# Patient Record
Sex: FEMALE | Race: WHITE
[De-identification: names, ages, dates, MRNs, and addresses within clinical notes are randomized per-mention and may not be internally consistent; named-entity substitution may affect disease eponyms.]

---

## 2020-08-17 ENCOUNTER — HOSPITAL ENCOUNTER (INPATIENT)
Dept: HOSPITAL 35 - ER | Age: 55
LOS: 1 days | Discharge: HOME | DRG: 192 | End: 2020-08-18
Attending: INTERNAL MEDICINE | Admitting: INTERNAL MEDICINE
Payer: COMMERCIAL

## 2020-08-17 VITALS — SYSTOLIC BLOOD PRESSURE: 142 MMHG | DIASTOLIC BLOOD PRESSURE: 62 MMHG

## 2020-08-17 VITALS — DIASTOLIC BLOOD PRESSURE: 56 MMHG | SYSTOLIC BLOOD PRESSURE: 128 MMHG

## 2020-08-17 VITALS — HEIGHT: 59.02 IN | WEIGHT: 135 LBS | BODY MASS INDEX: 27.21 KG/M2

## 2020-08-17 VITALS — DIASTOLIC BLOOD PRESSURE: 49 MMHG | SYSTOLIC BLOOD PRESSURE: 127 MMHG

## 2020-08-17 VITALS — DIASTOLIC BLOOD PRESSURE: 60 MMHG | SYSTOLIC BLOOD PRESSURE: 138 MMHG

## 2020-08-17 VITALS — SYSTOLIC BLOOD PRESSURE: 145 MMHG | DIASTOLIC BLOOD PRESSURE: 60 MMHG

## 2020-08-17 VITALS — DIASTOLIC BLOOD PRESSURE: 57 MMHG | SYSTOLIC BLOOD PRESSURE: 134 MMHG

## 2020-08-17 DIAGNOSIS — Z82.49: ICD-10-CM

## 2020-08-17 DIAGNOSIS — Z80.3: ICD-10-CM

## 2020-08-17 DIAGNOSIS — Z79.899: ICD-10-CM

## 2020-08-17 DIAGNOSIS — I10: ICD-10-CM

## 2020-08-17 DIAGNOSIS — Z71.6: ICD-10-CM

## 2020-08-17 DIAGNOSIS — E11.42: ICD-10-CM

## 2020-08-17 DIAGNOSIS — Z60.2: ICD-10-CM

## 2020-08-17 DIAGNOSIS — Z88.8: ICD-10-CM

## 2020-08-17 DIAGNOSIS — Z88.6: ICD-10-CM

## 2020-08-17 DIAGNOSIS — J44.1: Primary | ICD-10-CM

## 2020-08-17 DIAGNOSIS — Z88.0: ICD-10-CM

## 2020-08-17 DIAGNOSIS — Z20.828: ICD-10-CM

## 2020-08-17 DIAGNOSIS — F17.210: ICD-10-CM

## 2020-08-17 DIAGNOSIS — F41.9: ICD-10-CM

## 2020-08-17 DIAGNOSIS — F32.9: ICD-10-CM

## 2020-08-17 DIAGNOSIS — Z86.2: ICD-10-CM

## 2020-08-17 LAB
ALBUMIN SERPL-MCNC: 3.9 G/DL (ref 3.4–5)
ALT SERPL-CCNC: 43 U/L (ref 30–65)
ANION GAP SERPL CALC-SCNC: 17 MMOL/L (ref 7–16)
AST SERPL-CCNC: 37 U/L (ref 15–37)
BASOPHILS NFR BLD AUTO: 0.4 % (ref 0–2)
BILIRUB SERPL-MCNC: 0.4 MG/DL (ref 0.2–1)
BUN SERPL-MCNC: 12 MG/DL (ref 7–18)
CALCIUM SERPL-MCNC: 9.1 MG/DL (ref 8.5–10.1)
CHLORIDE SERPL-SCNC: 100 MMOL/L (ref 98–107)
CO2 SERPL-SCNC: 21 MMOL/L (ref 21–32)
CREAT SERPL-MCNC: 0.9 MG/DL (ref 0.6–1)
EOSINOPHIL NFR BLD: 4.4 % (ref 0–3)
ERYTHROCYTE [DISTWIDTH] IN BLOOD BY AUTOMATED COUNT: 14 % (ref 10.5–14.5)
GLUCOSE SERPL-MCNC: 257 MG/DL (ref 74–106)
GRANULOCYTES NFR BLD MANUAL: 54.5 % (ref 36–66)
HCT VFR BLD CALC: 44.1 % (ref 37–47)
HGB BLD-MCNC: 14.5 GM/DL (ref 12–15)
LYMPHOCYTES NFR BLD AUTO: 31.9 % (ref 24–44)
MCH RBC QN AUTO: 28 PG (ref 26–34)
MCHC RBC AUTO-ENTMCNC: 33 G/DL (ref 28–37)
MCV RBC: 84.9 FL (ref 80–100)
MONOCYTES NFR BLD: 8.8 % (ref 1–8)
NEUTROPHILS # BLD: 4.8 THOU/UL (ref 1.4–8.2)
PLATELET # BLD: 181 THOU/UL (ref 150–400)
POTASSIUM SERPL-SCNC: 3.8 MMOL/L (ref 3.5–5.1)
PROT SERPL-MCNC: 7.3 G/DL (ref 6.4–8.2)
RBC # BLD AUTO: 5.19 MIL/UL (ref 4.2–5)
SODIUM SERPL-SCNC: 138 MMOL/L (ref 136–145)
TROPONIN I SERPL-MCNC: <0.06 NG/ML (ref ?–0.06)
WBC # BLD AUTO: 8.8 THOU/UL (ref 4–11)

## 2020-08-17 PROCEDURE — 10879: CPT

## 2020-08-17 SDOH — SOCIAL STABILITY - SOCIAL INSECURITY: PROBLEMS RELATED TO LIVING ALONE: Z60.2

## 2020-08-17 NOTE — NUR
DR ADAM PAGED TO INFORM HER OF THE PT'S BLOOD SUGAR AND THAT SHE TAKES
METFORMIN AND LANTUS THAT ARE NOT ON THE PT'S EMAR. AT THE TIME HUMALOG IS THE
ONLY INSULIN ORDERED.

## 2020-08-17 NOTE — NUR
1620 PT ADMITTED ON 3W, ROOM 357. PT ALERT AND ORINETED X4, DENIES ANY CHEST
PAIN, NUASEA AND VOMITTING. PT IS ON ROOM AIR, NO SIGNS OF DISTRESS. CARDIAC
MONITOR ON, SR. VITALS SIGNS STABLE. NO SKINS ISSUES, PT REFUSE SCD'S.
CONSENTS SIGNED BY PT. PT IS UP AD JESSE. ADMISSION AND ASSESSMENT COMPLETED. PT
DENIES ANY NEEDS NOBLE. CALL LGITH AND TABLE WITHIN REACH. BED AT LOWEST LEVEL
AND NONSKID SOCKS ON.
HANDOFF REPORT GIVEN TO FLORIDA LING

## 2020-08-18 VITALS — SYSTOLIC BLOOD PRESSURE: 124 MMHG | DIASTOLIC BLOOD PRESSURE: 50 MMHG

## 2020-08-18 VITALS — SYSTOLIC BLOOD PRESSURE: 124 MMHG | DIASTOLIC BLOOD PRESSURE: 74 MMHG

## 2020-08-18 NOTE — NUR
CM called pt's room to complete initial assessment and discuss d/c planning.
Pt states she lives home alone, but her dtr, "Shay is there all of the
time." Pt also receives home care services from "[facility] She has an
aid 7x/wk for 3hr/day to assist w/chores, shopping , some assistance
w/dressing; otherwise, pt states she takes care of her own hygiene.  Pt has a
nebulizer. Pt denies hx w/SNF or HH. Pt states she is ready to go home. CM to
cont to follow to assist as needed.
-Note completed by Homa Lagunas

## 2020-08-18 NOTE — EKG
[facility]
[address]
[place]   [address]                     ELECTROCARDIOGRAM REPORT      
_______________________________________________________________________________
 
Name:       JUAREZ AVALOS       Room #:         357-P       ADM IN  
M.R.#:      [id number]                       Account #:      [id number]  
Admission:  20    Attend Phys:    Brigette Zavala MD   
Discharge:              Date of Birth:  [birth year]  
                                                          Report #: [id number]
                                                                    [id number]
_______________________________________________________________________________
THIS REPORT FOR:  
 
cc:  [facility] physician unknown
     [facility] physician unknown
     Andi Leiva MD                                            ~
THIS REPORT FOR:   //name//                          
 
                         [facility] ED
                                       
Test Date:    2020               Test Time:    04:04:14
Pat Name:     JUAREZ AVALOS         Department:   
Patient ID:   [id number]            Room:         [facility]
Gender:       F                        Technician:   GIOVANNA
:          1965               Requested By: Kaden Barbosa
Order Number: [id number] MD:   Andi Leiva
                                 Measurements
Intervals                              Axis          
Rate:         75                       P:            0
CA:           146                      QRS:          59
QRSD:         85                       T:            9
QT:           395                                    
QTc:          442                                    
                           Interpretive Statements
Sinus rhythm
Atrial premature complex
Low voltage, extremity leads
Probable anteroseptal infarct, old
Baseline wander in lead(s) V2,V3,V4,V5,V6
No previous ECG available for comparison
Electronically Signed On 2020 16:01:52 CDT by Andi Leiva
[email or web address]
 
 
 
 
 
 
 
 
 
 
 
 
 
  <ELECTRONICALLY SIGNED>
   By: Andi Leiva MD        
  20     1601
D: 20                           _____________________________________
T: 20 0404                           Andi Leiva MD          /EPI

## 2020-08-18 NOTE — NUR
Received awake on bed. Due medications given as prescribed, able to swallow
meds w/o difficulty. A+Ox4. On telemetry- SR, ST at times; no complaints of
chest pain, crushing sensation and heaviness. On room air. On carb controlled
diet- tolerating well; no nausea, no vomiting and no abdominal pain noted. On
blood sugar monitoring- taken and recorded accordingly; with sliding scale
insulin ordered- given as prescribed. Continent of bowel and bladder, able to
go to the toilet with standby assist. Falls bundle in place. With SL at R AC-
intact and flushing well; on IV antibiotics. Maintained on isolation- pending
covid results. Assisted in ADLs. Pt requesting to have lozenges for sorethroat
and Diclofenac cream for arthritis- Dr Zavala informed during her AM rounds.
To continue monitoring patient.

## 2024-09-06 NOTE — NUR
ASSESSMENT COMPLETED. PT IS UP AD JESSE. SHE IS STILL WALKING AROUND THE ROOM
AND REMAINS ON ROOM AIR. SHE DENIES NAUSEA OR VOMITING. ELEVATED HS BLOOD
SUGAR, SCHEDULED INSULIN GIVEN. PT C/O GENERAL ARTHRITIS PAIN TO KNEES AND
FEET REQUIRING TYLENOL, OTHERWISE DENIES ANY OTHER PAIN. AFEBRILE.MAKES NEEDS
KNOWN.WILL CONTINUE WITH POC TILL EOS. Physical Therapy Evaluation    Visit Type: Initial Evaluation  Visit: 1  Referring Provider: Sivakumar Olivares MD  Medical Diagnosis (from order): M54.16 - Lumbar radiculitis  M48.062 - Spinal stenosis of lumbar region with neurogenic claudication   Treatment Diagnosis: lumbar - increased pain/symptoms, impaired posture, impaired range of motion, impaired muscle length/flexibility, impaired joint play/mobility, impaired balance, impaired motor function/performance/coordination, impaired mobility, impaired strength, impaired gait, impaired activity tolerance, increased risk for falls and impaired sensory integration.  Onset  - Date of onset: 11/11/2023  Chart reviewed at time of initial evaluation (relevant co-morbidities, allergies, tests and medications listed):   - Diagnostic tests reviewed: X-Ray and MRI studies  HTN, HLD, MEN syndrome, GERD, glaucoma, CKD  07/18 Epidural steroid injection      IMPRESSION:     Grade 2 anterolisthesis of L5 relation to S1 with moderate intervertebral  joint space narrowing and facet arthritic changes.      IMPRESSION:     1. Advanced degenerative changes of the lumbar spine most pronounced at the  L4-L5 level where there is severe spinal canal stenosis and severe  bilateral neuroforaminal narrowing.  2. Known right renal mass measures up to 3.1 cm, please refer to separately  dictated MRI of the abdomen for further characterization of intra-abdominal  findings.  3. Additional degenerative changes as discussed in the body of the report.    IMPRESSION:     Slight increase in size of posterior right renal cortical mass suspicious  for renal cell carcinoma, now 3.1 cm. It would be amenable for percutaneous  ablation. Management and consultation with urology recommended.     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     Left spigelian abdominal wall hernia, with a nonobstructed loop of bowel  herniating through the abdominal wall defect      Dr. Rainey, a urologist,  recommended radiofrequency ablation, which she plans to schedule as soon as possible      SUBJECTIVE                                                                                                               History of Present Illness: Patient reports she had back pain in the past that resolved with muscle relaxer's. This time around she received an epidural injections from pain management that has helped reduce her pain. She also is taking gabapentin. She experiences Lumbar pain that radiates down bilateral lower extremities above the knee. Prolong standing (10 minutes)  and walking can exacerbate her pain. She finds herself being forward when walking to help with pain. She finds to have most of her pain in the morning that resolves with more activity.       Numbness/Tingling: yes, in fingertips that began about 1 month ago but has been improving; she believes it is two separate issues.   Does it interrupt sleep?:none.  Any recent falls within the past 6 months? None.      Pain / Symptoms  - Pain rating (out of 10): Current: 2 ; Best: 0; Worst: 10  - Location: Lumbar and BLE above knees   - Quality / Description: radiating  - Alleviating Factors: rest, prescription medications    Function:   Limitations / Exacerbation Factors:   - Independent with ADLs and IADLS  Prior Level of Function: pain free ADLs and IADLs,    Patient Goals: decreased pain. Increase activity level     Prior treatment  - outpatient PT  - Discharged from hospital, home health, or skilled nursing facility in last 30 days: no  Home Environment   - Patient lives with: adult children  - Type of home: single level home  - Assistance available: as needed  - Denies 2 or more falls or an unexplained fall with injury in the last year.  - Feel safe at home / work / school: yes      OBJECTIVE                                                                                                                    Vitals:  Blood Pressure (mmhg): 133/84  Pulse  Ox (%): 99  Heart Rate (beats per minute): 71  Blood Pressure (mmhg):      - Seated:, asymptomatic  Took BP medication this morning.     Observation   Lumbar Range of Motion:  Lumbar Flexion:90%    Lumbar Extension: 0%  (bilateral lower extremities)    Right Side Bendin%  Left Side Bendin%   Right Rotation: 75%/75% (more comfortable with hips blocked)  Left  Rotation: 75%/75% (more comfortable with hips blocked       Lower Extremity Manual Muscle Testing: All scores scored out of 5   Hip Flexion:  (Left:4) /  (Right: 5)  Hip Internal Rotation:  (Left:5) / (Right: 5)   Hip External Rotation:  (Left:4-) / (Right: 4-)       Knee Extension:  (Left:4+) / (Right: 5)     Ankle Dorsiflexion:  (Left:5) / (Right: 5)       Repeated lumbar flexion (x10): reduced pain    Palpation: tender to palpation along bilateral lumbar paraspinals   Joint mobility: L3-L5 central posterior to anterior passive accessory intervertebral movement and bilateral unilateral posterior to anterior passive accessory intervertebral movement     Special test:  - SLUMP (bilateral)                       Outcome/Assessments  30 second sit to stand: 6 repetitions  2 minute walk test: 198.5 feet (pain near waistline and bilateral posterior lower extremities.)     OSWESTRY Total Scored: 14  OSWESTRY Total Possible Score: 50  OSWESTRY Score Calculated: 28 %   Patient Specific Functional Scale:   Activity: Patient to stand for 15 minutes.   , Score: 5  Activity: Patient to walk for 15 minutes in the grocery store. , Score: 5  Average Score: 5  Each activity is scored: 0=unable to perform activity to 10=able to perform activity at the same level as before injury or problem    Treatment     Therapeutic Activity  Log roll technique x 5 minutes     Neuromuscular Re-Education  Seated lumbar flexion 3x20      Skilled input: verbal instruction/cues, tactile instruction/cues, demonstration, posture correction and facilitation    Writer verbally educated and  received verbal consent for hand placement, positioning of patient, and techniques to be performed today from patient for hand placement and palpation for techniques, therapist position for techniques and clothing adjustments for techniques as described above and how they are pertinent to the patient's plan of care.  Home Exercise Program  Access Code: [email or web address]  URL: [email or web address]  Date: 09/11/2024  Prepared by: Melodie Walsh    Exercises  - Seated Lumbar Flexion Stretch  - 10 x daily - 7 x weekly - 3 sets - 10 reps      ASSESSMENT                                                                                                          79 year old patient has reported functional limitations listed above impacted by signs and symptoms consistent with treatment diagnosis below.  Treatment Diagnosis:   - Involved: lumbar.  - Symptoms/impairments: increased pain/symptoms, impaired posture, impaired range of motion, impaired muscle length/flexibility, impaired joint play/mobility, impaired balance, impaired motor function/performance/coordination, impaired mobility, impaired strength, impaired gait, impaired activity tolerance, increased risk for falls and impaired sensory integration.    Patient's presentation is consistent with referring diagnosis. It appears that the epidural injections have helped manage her pain; however, she presents with bilateral lower extremity strength deficits, lumbar range of motion deficits, joint hypomobility along L3-L5 central posterior to anterior passive accessory intervertebral movement and bilateral unilateral posterior to anterior passive accessory intervertebral movement and tenderness to palpation of lumbar paraspinals. She would benefit from a physical therapy program that addresses the above deficits.   Pain/symptoms after session (out of 10): 0    Prognosis: Patient will benefit from skilled therapy.  Rehabilitative potential is:  good.  Predicted patient presentation: Low (stable) - Patient comorbidities and complexities, as defined above, will have little effect on progress for prescribed plan of care.  Education:   - Present and ready to learn: patient  - Results of above outlined education: Verbalizes understanding and Demonstrates understanding    PLAN                                                                                                                         The following skilled interventions to be implemented to achieve goals listed below:  Activities of Daily Living/Self Care (51980)  Gait Training (26447)  Neuromuscular Re-Education (30636)  Therapeutic Exercise (46773)  Heat/Cold (54887)  Ultrasound (00691)  Performance Test or Measure (46899)  Therapeutic Activity (60040)  Manual Therapy (69991)  Electrical Stimulation Attended (24085)  Mechanical Traction-12313    Frequency / Duration  1 times per week tapering as patient progresses for 12 weeks for an estimated total of 12 visits    Patient involved in and agreed to plan of care and goals.  Patient given attendance policy at time of initial evaluation.    Suggestions for next session as indicated: Progress per plan of care    Goals  Long Term Goals: to be met by end of plan of care  1. Pt will improve Oswestry >12 to reach the minimum clinical important difference score, to be achieved in 12 visits.   2. Patient to stand for 15 minutes with PSFS rating of 7 to be achieved in 12 visits.   3. Patient to walk for 15 minutes in the grocery store with PSFS rating of 7, to be achieved in 12 visits.   4. Patient to perform 10 repetitions during 30 second sit to stand to improve lower extremity strength during functional tasks, to be achieved in 12 visits.   5. Patient to ambulate 280 feet during 2 minute walk test to improve walking endurance, to be achieved in 12 visits.   6. Patient to be independent with HEP, to be achieved in 12 visits.     Melodie Walsh,  PT, DPT  Therapy procedure time and total treatment time can be found documented on the Time Entry flowsheet